# Patient Record
Sex: MALE | Race: WHITE | NOT HISPANIC OR LATINO | Employment: OTHER | URBAN - METROPOLITAN AREA
[De-identification: names, ages, dates, MRNs, and addresses within clinical notes are randomized per-mention and may not be internally consistent; named-entity substitution may affect disease eponyms.]

---

## 2023-02-09 ENCOUNTER — ASSISTED LIVING VISIT (OUTPATIENT)
Dept: GERIATRICS | Facility: CLINIC | Age: 78
End: 2023-02-09
Payer: COMMERCIAL

## 2023-02-09 VITALS
TEMPERATURE: 98 F | WEIGHT: 163 LBS | SYSTOLIC BLOOD PRESSURE: 129 MMHG | RESPIRATION RATE: 20 BRPM | DIASTOLIC BLOOD PRESSURE: 69 MMHG | HEART RATE: 90 BPM

## 2023-02-09 DIAGNOSIS — G30.9 ALZHEIMER'S DISEASE (H): Primary | ICD-10-CM

## 2023-02-09 DIAGNOSIS — F02.80 ALZHEIMER'S DISEASE (H): Primary | ICD-10-CM

## 2023-02-09 DIAGNOSIS — Z71.89 ADVANCED DIRECTIVES, COUNSELING/DISCUSSION: ICD-10-CM

## 2023-02-09 PROCEDURE — 99342 HOME/RES VST NEW LOW MDM 30: CPT | Performed by: NURSE PRACTITIONER

## 2023-02-09 NOTE — PROGRESS NOTES
"[facility] GERIATRICS    PRIMARY CARE PROVIDER AND CLINIC:  KADIE Doty CNP, [address] [facility] [address]  Chief Complaint   Patient presents with     [facility] Medical Record Number:  [id number]  Place of Service where encounter took place:  [facility]) [[id number]]    Michael Duane Therriault  is a 77 year old  (1945), admitted to Memory Care at the above facility 2/2/2023 for a higher level of care due to progressive dementia.  He was living at home with his SO/guardian Karley Lubin.     HPI:    He has a medical history significant for Alzheimer's disease, history of subarachnoid and subdural hematoma in 2020. No other medical issues and takes no meds. He had Neuropsychiatric testing 1/2020 and was only able to complete a few tasks due to severe deficits.     He is unable to provide history. Resting in bed. Repeatedly states, \"give me that stuff to eat.\" Does not respond to questions. Staff reports he's been pleasant and cooperative. He had a fall 2/6/2023 without injury. Ambulates without a device. Requires assist of 1 with cares. XR abdomen was obtained yesterday due to an emesis, abdominal tenderness and hypoactive bowel sounds and showed minimal fecal stasis. Staff reports he had a large BM and symptoms resolved.     CODE STATUS/ADVANCE DIRECTIVES DISCUSSION:  No Order  DNR/DNI  ALLERGIES: No Known Allergies   PAST MEDICAL HISTORY:   Past Medical History:   Diagnosis Date     Alzheimer's disease (H)      Subdural hematoma       PAST SURGICAL HISTORY:   has no past surgical history on file.  FAMILY HISTORY: family history is not on file.  SOCIAL HISTORY:   reports that he has never smoked. He has never used smokeless tobacco. He reports that he does not currently use alcohol. He reports that he does not use drugs.  Patient's living condition: lives in an assisted living facility. SO Karley Lubin is guardian. No children. He " worked as a caretaker.        No current outpatient medications on file.     No current facility-administered medications for this visit.       ROS:  Unable to obtain due to dementia. Positives as noted under HPI.       Vitals:  /69   Pulse 90   Temp 98  F (36.7  C)   Resp 20   Wt 73.9 kg (163 lb)   Exam:  GENERAL APPEARANCE:  Alert, in no distress, thin  ENT:  [place], oropharynx clear  EYES:  conjunctiva and lids normal  NECK:  no adenopathy, no thyromegaly  RESP:  lungs clear to auscultation   CV:  regular rate and rhythm, no murmur, no edema  ABDOMEN:  normal bowel sounds, soft, nontender, no distension, no masses   M/S:   gait unsteady. ROBERT with good strength. No joint inflammation  SKIN:  no rashes or open areas  PSYCH:  oriented to self, insight and judgement impaired, memory impaired , affect and mood normal    Lab/Diagnostic data:  Recent labs in [facility] reviewed by me today.     ASSESSMENT / PLAN:  (G30.9,  F02.80) Alzheimer's disease (H)  (primary encounter diagnosis)  Comment: severe deficits with loss of language and low functionals status. He takes no chronic meds.   Plan: Memory Care staff assistance with cares, meals, activities, med admin  Discussed with staff.     (Z71.89) Advanced directives, counseling/discussion  Comment: POLST on file indicates DNR/DNI, which is consistent with documentation from [facility] 11/8/2022  Plan: epic orders updated.        Electronically signed by:  KADIE Doty CNP

## 2023-02-09 NOTE — LETTER
2/9/2023        RE: Michael Duane Therriault  [address] [address]        [facility] GERIATRICS    PRIMARY CARE PROVIDER AND CLINIC:  KADIE Doty CNP, [address] [facility] [address]***  Chief Complaint   Patient presents with     [facility] Medical Record Number:  [id number]  Place of Service where encounter took place:  [facility] [[id number]]    Michael Duane Therriault  is a 77 year old  (1945), admitted to the above facility from home due to care needs ***. .   HPI:    ***    CODE STATUS/ADVANCE DIRECTIVES DISCUSSION:  No Order  DNR only  ALLERGIES: No Known Allergies   PAST MEDICAL HISTORY: No past medical history on file.   PAST SURGICAL HISTORY:   has no past surgical history on file.  FAMILY HISTORY: family history is not on file.  SOCIAL HISTORY:     Patient's living condition: lives in an assisted living facility    Post Discharge Medication Reconciliation Status:   MED REC REQUIRED{TIP  Click the link below to document or use med rec list, use list to pull in response :229992}  Post Medication Reconciliation Status: {MED REC LIST:882971}       No current outpatient medications on file.     No current facility-administered medications for this visit.       ROS:  {ROS FGS:027344}    Vitals:  /69   Pulse 90   Temp 98  F (36.7  C)   Resp 20   Wt 73.9 kg (163 lb)   Exam:  {Nursing home physical exam :606088}    Lab/Diagnostic data:  {fgslab:569649}    ASSESSMENT/PLAN:    {FGS DX2:558211}    Orders:  {fgsorders:985237}  ***    Total time spent with patient visit at the skilled nursing facility was {1/2/3/4/5:721270} including {1/2/3/4/5:021300}. Greater than 50% of total time spent with counseling and coordinating care due to ***.     Electronically signed by:  Wilber Ernst ***                      Sincerely,        KADIE Doty CNP

## 2023-03-03 ENCOUNTER — PATIENT OUTREACH (OUTPATIENT)
Dept: GERIATRIC MEDICINE | Facility: CLINIC | Age: 78
End: 2023-03-03
Payer: COMMERCIAL

## 2023-03-03 NOTE — LETTER
March 3, 2023      MICHAEL DUANE THERRIAULT  THE [place] [address] [facility] [address]      Dear Bharat:    Valente, my name is Sarahy Miramontes MA, LSW. You are eligible for [facility] Case Management program through your enrollment in [employer]. We think you may benefit from this program. I am writing to invite you to be in our Case Management program.     The following are a few things the Case Management program can help you with:    Select or change your primary care doctor or primary care clinic    Find a specialist, if needed, near your home    Receive preventive care, such as flu shots    Join programs offered by [facility] that interest you, like wellness programs    As your , I will do the following to enroll you in the Case Management Program:    Schedule a telephone call with you to answer any questions you may have about case management    Conduct an assessment by phone to identify needs case management can help you with    Develop a care plan to address those needs    Help you obtain available care and resources as needed    I will call you soon to discuss your interest in this program and your health care needs.    Being in the Case Management program is voluntary and offered to you at no cost. If you accept being in the Case Management program, you can stop any time by calling me at [phone].    Sincerely,      Sarahy Miramontes MA, LSW    E-mail: [email or web address]  Phone: [phone]      [facility]    [id number]                                     (01/2020)          [facility][note 3] [place] [address]  [phone]  fax [phone]  [facility]

## 2023-03-03 NOTE — PROGRESS NOTES
[facility] Care Coordination Contact    Member became effective with  [employer] on 3/1/2023 with [employer].     Welcome letter sent to Sánchez GAFFNEY.     Sanjana Marte  Care Management Specialist   [facility]   [phone]

## 2023-03-05 PROBLEM — G30.9 ALZHEIMER'S DISEASE (H): Status: ACTIVE | Noted: 2023-03-05

## 2023-03-05 PROBLEM — F02.80 ALZHEIMER'S DISEASE (H): Status: ACTIVE | Noted: 2023-03-05

## 2023-03-07 ENCOUNTER — ASSISTED LIVING VISIT (OUTPATIENT)
Dept: GERIATRICS | Facility: CLINIC | Age: 78
End: 2023-03-07
Payer: COMMERCIAL

## 2023-03-07 VITALS
RESPIRATION RATE: 20 BRPM | DIASTOLIC BLOOD PRESSURE: 77 MMHG | SYSTOLIC BLOOD PRESSURE: 118 MMHG | TEMPERATURE: 97.7 F | WEIGHT: 134 LBS | HEART RATE: 61 BPM

## 2023-03-07 DIAGNOSIS — Z71.89 ADVANCED DIRECTIVES, COUNSELING/DISCUSSION: ICD-10-CM

## 2023-03-07 DIAGNOSIS — R26.81 GAIT INSTABILITY: ICD-10-CM

## 2023-03-07 DIAGNOSIS — F02.80 ALZHEIMER'S DISEASE (H): Primary | ICD-10-CM

## 2023-03-07 DIAGNOSIS — G30.9 ALZHEIMER'S DISEASE (H): Primary | ICD-10-CM

## 2023-03-07 PROCEDURE — 99349 HOME/RES VST EST MOD MDM 40: CPT | Performed by: NURSE PRACTITIONER

## 2023-03-07 NOTE — PROGRESS NOTES
"[facility] GERIATRICS    Chief Complaint   Patient presents with     RECHECK       The health plan new enrollment has happened. I have reviewed the  MDS, the preventative needs,  and facility care plan. The level of care is appropriate. I have reviewed the code status/advanced directives.       HPI:  Michael Duane Therriault is a 77 year old  (1945), who is being seen today for an episodic care visit at: [facility]) [[id number]].  He moved to Memory Care at this facility 2/2/2023 for a higher level of care due to advanced dementia.   Medical history significant for Alzheimer's disease, history of subarachnoid and subdural hematoma in 2020. No other medical issues and takes no meds. He had Neuropsychiatric testing 1/2020 and was only able to complete a few tasks due to severe deficits.     Today's concern is:      Alzheimer's disease (H)  Gait instability  Advanced directives, counseling/discussion  He is unable to provide history. More communicative today, then gets up and walks away.  He's in his apartment and says he's going to \"work on this number puzzle.\" He repeatedly asks for something to eat. Doesn't respond to questions appropriately. Staff reports no behavior issues. He attempted to eat flowers that were on the table. Ambulates without a device. Requires assist of 1 with cares.   Spoke with his friend/guardian Karley Lubin. They have been friends for many years and she was his caregiver at home. She reports he would eat anything, including dirt, cat food, plants, paper napkins. She's not sure he recognizes her anymore.     Allergies, and PMH/PSH reviewed in EPIC today    REVIEW OF SYSTEMS:  Unable to obtain due to dementia. Positives as noted under HPI.       Objective:   /77   Pulse 61   Temp 97.7  F (36.5  C)   Resp 20   Wt 60.8 kg (134 lb)   GENERAL APPEARANCE:  Alert, in no distress, thin  ENT:  [place], oropharynx clear  EYES:  conjunctiva and lids normal  NECK:  " no adenopathy, no thyromegaly  RESP:  lungs clear to auscultation   CV:  regular rate and rhythm, no murmur, no edema  ABDOMEN: soft, nontender, no distension, no masses   M/S:   gait unsteady. Slightly stooped posture. ROBERT with good strength. No joint inflammation  SKIN:  no rashes or open areas  PSYCH:  oriented to self, insight and judgement impaired, memory impaired , affect and mood normal    Recent labs in [facility] reviewed by me today.     ASSESSMENT / PLAN:  (G30.9,  F02.80) Alzheimer's disease (H)  (primary encounter diagnosis)  (R26.81) Gait instability  Comment: advanced disease with loss of verbal skills, severe cognitive deficits. No behavior issues   Plan: Memory Care staff assistance with cares, meals, activities, med admin      (Z71.89) Advanced directives, counseling/discussion  Comment: he has a healthcare directive and POLST. Guardian confirms goals of care are comfort focused, DNR/DNI, avoid hospitalization, hospice when appropriate  Plan: orders updated             Electronically signed by: KADIE Doty CNP

## 2023-03-07 NOTE — LETTER
"    3/7/2023        RE: Michael Duane Therriault  [facility]  [address] [facility] [address]        [facility] GERIATRICS    Chief Complaint   Patient presents with     RECHECK       The health plan new enrollment has happened. I have reviewed the  MDS, the preventative needs,  and facility care plan. The level of care is appropriate. I have reviewed the code status/advanced directives.       HPI:  Michael Duane Therriault is a 77 year old  (1945), who is being seen today for an episodic care visit at: [facility]) [[id number]].  He moved to Memory Care at this facility 2/2/2023 for a higher level of care due to advanced dementia.   Medical history significant for Alzheimer's disease, history of subarachnoid and subdural hematoma in 2020. No other medical issues and takes no meds. He had Neuropsychiatric testing 1/2020 and was only able to complete a few tasks due to severe deficits.     Today's concern is:      Alzheimer's disease (H)  Gait instability  Advanced directives, counseling/discussion  He is unable to provide history. More communicative today, then gets up and walks away.  He's in his apartment and says he's going to \"work on this number puzzle.\" He repeatedly asks for something to eat. Doesn't respond to questions appropriately. Staff reports no behavior issues. He attempted to eat flowers that were on the table. Ambulates without a device. Requires assist of 1 with cares.   Spoke with his friend/guardian Karley Lubin. They have been friends for many years and she was his caregiver at home. She reports he would eat anything, including dirt, cat food, plants, paper napkins. She's not sure he recognizes her anymore.     Allergies, and PMH/PSH reviewed in EPIC today    REVIEW OF SYSTEMS:  Unable to obtain due to dementia. Positives as noted under HPI.       Objective:   /77   Pulse 61   Temp 97.7  F (36.5  C)   Resp 20   Wt 60.8 kg (134 " lb)   GENERAL APPEARANCE:  Alert, in no distress, thin  ENT:  [place], oropharynx clear  EYES:  conjunctiva and lids normal  NECK:  no adenopathy, no thyromegaly  RESP:  lungs clear to auscultation   CV:  regular rate and rhythm, no murmur, no edema  ABDOMEN: soft, nontender, no distension, no masses   M/S:   gait unsteady. Slightly stooped posture. ROBERT with good strength. No joint inflammation  SKIN:  no rashes or open areas  PSYCH:  oriented to self, insight and judgement impaired, memory impaired , affect and mood normal    Recent labs in [facility] reviewed by me today.     ASSESSMENT / PLAN:  (G30.9,  F02.80) Alzheimer's disease (H)  (primary encounter diagnosis)  (R26.81) Gait instability  Comment: advanced disease with loss of verbal skills, severe cognitive deficits. No behavior issues   Plan: Memory Care staff assistance with cares, meals, activities, med admin      (Z71.89) Advanced directives, counseling/discussion  Comment: he has a healthcare directive and POLST. Guardian confirms goals of care are comfort focused, DNR/DNI, avoid hospitalization, hospice when appropriate  Plan: orders updated             Electronically signed by: KADIE Doty CNP             Sincerely,        KADIE Doty CNP

## 2023-04-06 ENCOUNTER — ASSISTED LIVING VISIT (OUTPATIENT)
Dept: GERIATRICS | Facility: CLINIC | Age: 78
End: 2023-04-06
Payer: COMMERCIAL

## 2023-04-06 VITALS
RESPIRATION RATE: 15 BRPM | TEMPERATURE: 99.1 F | DIASTOLIC BLOOD PRESSURE: 76 MMHG | HEART RATE: 86 BPM | SYSTOLIC BLOOD PRESSURE: 133 MMHG | WEIGHT: 134 LBS

## 2023-04-06 DIAGNOSIS — R26.81 GAIT INSTABILITY: ICD-10-CM

## 2023-04-06 DIAGNOSIS — G30.9 ALZHEIMER'S DISEASE (H): Primary | ICD-10-CM

## 2023-04-06 DIAGNOSIS — F02.80 ALZHEIMER'S DISEASE (H): Primary | ICD-10-CM

## 2023-04-06 PROCEDURE — 99348 HOME/RES VST EST LOW MDM 30: CPT | Performed by: NURSE PRACTITIONER

## 2023-04-06 NOTE — PROGRESS NOTES
"[facility] GERIATRICS    Chief Complaint   Patient presents with     RECHECK     HPI:  Michael Duane Therriault is a 77 year old  (1945), who is being seen today for an episodic care visit at: [facility] [[id number]].   He moved to Memory Care at this facility 2/2/2023 for a higher level of care due to advanced dementia.   Medical history significant for Alzheimer's disease, history of subarachnoid and subdural hematoma in 2020. No other medical issues and takes no meds. He had Neuropsychiatric testing 1/2020 and was only able to complete a few tasks due to severe deficits.        Today's concern is:      Alzheimer's disease (H)  Gait instability   He is resting in bed, awake and makes eye contact.  Does not respond to questions. Ambulates without a device. No falls. Staff reports he continues to try and eat various items, including sugar packets and paper. No behavior issues. Requires assist of 1 with cares.   Spoke with his friend/POWINSTON Lubin, who reports he had mild cold symptoms, which resolved. She noticed an episode when his left hand was purple in color. This quickly resolved with a change in position. She feels he has \"faded a little bit more\" but has adjusted to the unit.       Allergies, and PMH/PSH reviewed in EPIC today    REVIEW OF SYSTEMS:  Unable to obtain due to dementia. Positives as noted under HPI.       Objective:   /76   Pulse 86   Temp 99.1  F (37.3  C)   Resp 15   Wt 60.8 kg (134 lb)   GENERAL APPEARANCE:  Alert, in no distress, thin  ENT:  [place], oropharynx clear  EYES:  conjunctiva and lids normal  NECK:  no adenopathy, no thyromegaly  RESP:  lungs clear to auscultation   CV:  regular rate and rhythm, no murmur, no edema  ABDOMEN: soft, nontender, no distension, no masses   M/S:  resting in bed.  ROBERT with good strength. No joint inflammation  SKIN:  no rashes or open areas  PSYCH:  oriented to self, insight and judgement impaired, memory impaired " , affect and mood normal    Recent labs in EPIC reviewed by me today.       ASSESSMENT / PLAN:  (G30.9,  F02.80) Alzheimer's disease (H)  (primary encounter diagnosis)  Comment: severe deficits with loss of verbal skills and low functional status. He has adjusted well to the unit  Plan: Memory Care staff assistance with cares, meals, activities, med admin  Goals of care are comfort focuses, minimal medical interventions, avoid hospitalization     (R26.81) Gait instability  Comment: no falls   Plan: staff supervision with mobility and assistance with cares           Electronically signed by: KADIE Doty CNP

## 2023-04-06 NOTE — LETTER
4/6/2023        RE: Michael Duane Therriault  C/o Karley Lubin  [address]  [facility] [address]        [facility] GERIATRICS    Chief Complaint   Patient presents with    RECHECK     HPI:  Michael Duane Therriault is a 77 year old  (1945), who is being seen today for an episodic care visit at: [facility]) [[id number]]. Today's concern is: ***    Allergies, and PMH/PSH reviewed in [facility] today.  REVIEW OF SYSTEMS:  {cgmtst62:262914}    Objective:   /76   Pulse 86   Temp 99.1  F (37.3  C)   Resp 15   Wt 60.8 kg (134 lb)   {Nursing home physical exam :020439}    {fgslab:099052}    Assessment/Plan:  {FGS DX2:199579}    MED REC REQUIRED{TIP  Click the link below to document or use med rec list, use list to pull in response :316465}  Post Medication Reconciliation Status: {MED REC LIST:890892}      Orders:  {fgsorders:967183}  ***    Electronically signed by: Wilber Ernst ***          Sincerely,        KADIE Doty CNP

## 2023-04-07 NOTE — PROGRESS NOTES
Rad[facility] Del[facility] [employer] Chart review      chart   No triggering events at this time   CM will follow-up as needed     Sarahy Miramontes MA [facility] Care Coordinator   [phone] - work cell phone   [phone] - work fax    ,

## 2023-04-09 ENCOUNTER — HEALTH MAINTENANCE LETTER (OUTPATIENT)
Age: 78
End: 2023-04-09

## 2023-04-13 ENCOUNTER — DOCUMENTATION ONLY (OUTPATIENT)
Dept: OTHER | Facility: CLINIC | Age: 78
End: 2023-04-13
Payer: COMMERCIAL

## 2023-04-21 ENCOUNTER — DOCUMENTATION ONLY (OUTPATIENT)
Dept: OTHER | Facility: CLINIC | Age: 78
End: 2023-04-21
Payer: COMMERCIAL

## 2023-05-23 ENCOUNTER — ASSISTED LIVING VISIT (OUTPATIENT)
Dept: GERIATRICS | Facility: CLINIC | Age: 78
End: 2023-05-23
Payer: COMMERCIAL

## 2023-05-23 VITALS
DIASTOLIC BLOOD PRESSURE: 77 MMHG | RESPIRATION RATE: 20 BRPM | WEIGHT: 128 LBS | SYSTOLIC BLOOD PRESSURE: 101 MMHG | HEART RATE: 92 BPM | TEMPERATURE: 98.8 F

## 2023-05-23 DIAGNOSIS — R26.81 GAIT INSTABILITY: ICD-10-CM

## 2023-05-23 DIAGNOSIS — F02.80 ALZHEIMER'S DISEASE (H): Primary | ICD-10-CM

## 2023-05-23 DIAGNOSIS — R63.4 WEIGHT LOSS: ICD-10-CM

## 2023-05-23 DIAGNOSIS — G30.9 ALZHEIMER'S DISEASE (H): Primary | ICD-10-CM

## 2023-05-23 PROCEDURE — 99349 HOME/RES VST EST MOD MDM 40: CPT | Performed by: NURSE PRACTITIONER

## 2023-05-23 NOTE — PROGRESS NOTES
[facility] GERIATRICS    Chief Complaint   Patient presents with     RECHECK     HPI:  Michael Duane Therriault is a 77 year old  (1945), who is being seen today for an episodic care visit at: [facility]) [[id number]].   He moved to Memory Care at this facility 2/2/2023 for a higher level of care due to advanced dementia.   Medical history significant for Alzheimer's disease, history of subarachnoid and subdural hematoma in 2020. No other medical issues and takes no meds. He had Neuropsychiatric testing 1/2020 and was only able to complete a few tasks due to severe deficits.     Today's concern is:      Alzheimer's disease (H)  Weight loss  Gait instability  He is minimally verbal and unable to provide history. His friend/POA of many years Karley Lubin is present and feels he no longer recognizes her. Remains ambulatory without a device. No falls. Requires assist of 1 with cares. Able to feed himself and will attempt to eat napkins, flowers, packets of sugar.     Allergies, and PMH/PSH reviewed in EPIC today    REVIEW OF SYSTEMS:  Unable to obtain due to dementia. Positives as noted under HPI.       Objective:   /77   Pulse 92   Temp 98.8  F (37.1  C)   Resp 20   Wt 58.1 kg (128 lb)   GENERAL APPEARANCE:  Alert, in no distress, thin  ENT:  [place] oropharynx clear  EYES:  conjunctiva and lids normal  NECK:  no adenopathy, no thyromegaly  RESP:  lungs clear to auscultation   CV:  regular rate and rhythm, no murmur, no edema  ABDOMEN: soft, nontender, no distension, no masses   M/S:  up in chair. ROBERT with good strength. No joint inflammation  SKIN:  no rashes or open areas  PSYCH:  oriented to self, insight and judgement impaired, memory impaired , affect and mood normal    Recent labs in [facility] reviewed by me today.       ASSESSMENT / PLAN:  (G30.9,  F02.80) Alzheimer's disease (H)  (primary encounter diagnosis)  Comment: advanced disease with loss of verbal skills, low functional  status   Discussed progressive nature of dementia with friend/POA Karley Lubin, who has a good understanding of his situation. Goals of care are comfort focused, minimal interventions, hospice if his status declines   Plan: Memory Care staff assistance with cares, meals, activities, med admin    (R63.4) Weight loss  Comment: weight is down 6 lbs in the past month. Good appetite and no signs of GI issues   Plan: encourage intake, follow weight     (R26.81) Gait instability  Comment: unsteady but no recent falls   Plan: staff assistance with cares, supervision with mobility           Electronically signed by: KADIE Doty CNP

## 2023-05-23 NOTE — LETTER
5/23/2023        RE: Michael Duane Therriault  C/o Karley Lubin  [address]  [facility] [address]        [facility] GERIATRICS    Chief Complaint   Patient presents with     RECHECK     HPI:  Michael Duane Therriault is a 77 year old  (1945), who is being seen today for an episodic care visit at: THE [facility]) [[id number]].   He moved to Memory Care at this facility 2/2/2023 for a higher level of care due to advanced dementia.   Medical history significant for Alzheimer's disease, history of subarachnoid and subdural hematoma in 2020. No other medical issues and takes no meds. He had Neuropsychiatric testing 1/2020 and was only able to complete a few tasks due to severe deficits.     Today's concern is:      Alzheimer's disease (H)  Weight loss  Gait instability  He is minimally verbal and unable to provide history. His friend/POA of many years Karley Lubin is present and feels he no longer recognizes her. Remains ambulatory without a device. No falls. Requires assist of 1 with cares. Able to feed himself and will attempt to eat napkins, flowers, packets of sugar.     Allergies, and PMH/PSH reviewed in EPIC today    REVIEW OF SYSTEMS:  Unable to obtain due to dementia. Positives as noted under HPI.       Objective:   /77   Pulse 92   Temp 98.8  F (37.1  C)   Resp 20   Wt 58.1 kg (128 lb)   GENERAL APPEARANCE:  Alert, in no distress, thin  ENT:  [place], oropharynx clear  EYES:  conjunctiva and lids normal  NECK:  no adenopathy, no thyromegaly  RESP:  lungs clear to auscultation   CV:  regular rate and rhythm, no murmur, no edema  ABDOMEN: soft, nontender, no distension, no masses   M/S:  up in chair. ROBERT with good strength. No joint inflammation  SKIN:  no rashes or open areas  PSYCH:  oriented to self, insight and judgement impaired, memory impaired , affect and mood normal    Recent labs in [facility] reviewed by me today.       ASSESSMENT / PLAN:  (G30.9,  F02.80)  Alzheimer's disease (H)  (primary encounter diagnosis)  Comment: advanced disease with loss of verbal skills, low functional status   Discussed progressive nature of dementia with friend/POA Karley Lubin, who has a good understanding of his situation. Goals of care are comfort focused, minimal interventions, hospice if his status declines   Plan: Memory Care staff assistance with cares, meals, activities, med admin    (R63.4) Weight loss  Comment: weight is down 6 lbs in the past month. Good appetite and no signs of GI issues   Plan: encourage intake, follow weight     (R26.81) Gait instability  Comment: unsteady but no recent falls   Plan: staff assistance with cares, supervision with mobility           Electronically signed by: KADIE Doty CNP             Sincerely,        KADIE Doty CNP

## 2023-07-06 ENCOUNTER — ASSISTED LIVING VISIT (OUTPATIENT)
Dept: GERIATRICS | Facility: CLINIC | Age: 78
End: 2023-07-06
Payer: COMMERCIAL

## 2023-07-06 VITALS
HEART RATE: 65 BPM | TEMPERATURE: 98.3 F | WEIGHT: 135 LBS | DIASTOLIC BLOOD PRESSURE: 70 MMHG | RESPIRATION RATE: 14 BRPM | SYSTOLIC BLOOD PRESSURE: 121 MMHG

## 2023-07-06 DIAGNOSIS — R26.81 GAIT INSTABILITY: ICD-10-CM

## 2023-07-06 DIAGNOSIS — G30.9 ALZHEIMER'S DISEASE (H): Primary | ICD-10-CM

## 2023-07-06 DIAGNOSIS — F02.80 ALZHEIMER'S DISEASE (H): Primary | ICD-10-CM

## 2023-07-06 DIAGNOSIS — R63.4 WEIGHT LOSS: ICD-10-CM

## 2023-07-06 PROCEDURE — 99348 HOME/RES VST EST LOW MDM 30: CPT | Performed by: NURSE PRACTITIONER

## 2023-07-06 NOTE — LETTER
7/6/2023        RE: Michael Duane Therriault  C/o Karley Lubin  [address]  [facility] [address]        [facility] GERIATRICS    Chief Complaint   Patient presents with    RECHECK     HPI:  Michael Duane Therriault is a 78 year old  (1945), who is being seen today for an episodic care visit at: [facility] ([facility]) [[id number]]. Today's concern is: ***    Allergies, and PMH/PSH reviewed in [facility] today.  REVIEW OF SYSTEMS:  {pcnnmo79:197520}    Objective:   /70   Pulse 65   Temp 98.3  F (36.8  C)   Resp 14   Wt 61.2 kg (135 lb)   {Nursing home physical exam :461279}    {fgslab:929979}    Assessment/Plan:  {FGS DX2:634919}    MED REC REQUIRED{TIP  Click the link below to document or use med rec list, use list to pull in response :952417}  Post Medication Reconciliation Status: {MED REC LIST:011305}      Orders:  {fgsorders:291140}  ***    Electronically signed by: Wilber Ernst ***          Sincerely,        KADIE Doty CNP

## 2023-07-06 NOTE — PROGRESS NOTES
[facility] GERIATRICS    Chief Complaint   Patient presents with     RECHECK     HPI:  Michael Duane Therriault is a 78 year old  (1945), who is being seen today for an episodic care visit at: [facility]) [[id number]].   He moved to Memory Care at this facility 2/2/2023 for a higher level of care due to advanced dementia.   Medical history significant for Alzheimer's disease, history of subarachnoid and subdural hematoma in 2020. No other medical issues and takes no meds. He had Neuropsychiatric testing 1/2020 and was only able to complete a few tasks due to severe deficits.     Today's concern is:      Alzheimer's disease (H)  Weight loss  Gait instability   He is unable to provide history. His friend/guardian Karley Lubin is present and feels he's doing well. He's smiling and in a good mood today, repeatedly waved at us during the visit. He seems to recognize Karley as a familiar face. Ambulatory without a device. No falls. Requires assist of 1 with cares. Staff reports no new issues.      Allergies, and PMH/PSH reviewed in EPIC today    REVIEW OF SYSTEMS:  Unable to obtain due to dementia. Positives as noted under HPI.       Objective:   /70   Pulse 65   Temp 98.3  F (36.8  C)   Resp 14   Wt 61.2 kg (135 lb)    Exam unchanged   GENERAL APPEARANCE:  Alert, in no distress, thin  ENT:  [place], oropharynx clear  EYES:  conjunctiva and lids normal  NECK:  no adenopathy, no thyromegaly  RESP:  lungs clear to auscultation   CV:  regular rate and rhythm, no murmur, no edema  ABDOMEN: soft, nontender, no distension, no masses   M/S:  up in chair. ROBERT with good strength. No joint inflammation  SKIN:  no rashes or open areas  PSYCH:  oriented to self, insight and judgement impaired, memory impaired , affect and mood normal    Recent labs in [employer] reviewed by me today.     ASSESSMENT / PLAN:  (G30.9,  F02.80) Alzheimer's disease (H)  (primary encounter diagnosis)  Comment: severe deficits  with loss of verbal skills. Appears in good spirits, no behavior issues   Plan: Memory Care staff assistance with cares, meals, activities, med admin  Discussed with staff     (R63.4) Weight loss  Comment: weight is back up to 135 lbs, baseline   Plan: encourage intake. Follow weight    (R26.81) Gait instability  Comment: no recent falls   Plan: supervision and staff assistance with cares and mobility         Electronically signed by: KADIE Doty CNP

## 2023-08-01 ENCOUNTER — ASSISTED LIVING VISIT (OUTPATIENT)
Dept: GERIATRICS | Facility: CLINIC | Age: 78
End: 2023-08-01
Payer: COMMERCIAL

## 2023-08-01 VITALS
RESPIRATION RATE: 17 BRPM | DIASTOLIC BLOOD PRESSURE: 56 MMHG | SYSTOLIC BLOOD PRESSURE: 119 MMHG | TEMPERATURE: 99.3 F | HEART RATE: 77 BPM | WEIGHT: 135 LBS

## 2023-08-01 DIAGNOSIS — H10.33 ACUTE CONJUNCTIVITIS OF BOTH EYES, UNSPECIFIED ACUTE CONJUNCTIVITIS TYPE: Primary | ICD-10-CM

## 2023-08-01 DIAGNOSIS — F02.80 ALZHEIMER'S DISEASE (H): ICD-10-CM

## 2023-08-01 DIAGNOSIS — G30.9 ALZHEIMER'S DISEASE (H): ICD-10-CM

## 2023-08-01 PROCEDURE — 99348 HOME/RES VST EST LOW MDM 30: CPT | Performed by: NURSE PRACTITIONER

## 2023-08-01 RX ORDER — TOBRAMYCIN AND DEXAMETHASONE 3; 1 MG/ML; MG/ML
1 SUSPENSION/ DROPS OPHTHALMIC 3 TIMES DAILY
Qty: 1.1 ML | Refills: 0 | Status: SHIPPED | OUTPATIENT
Start: 2023-08-01 | End: 2023-08-08

## 2023-08-01 NOTE — LETTER
8/1/2023        RE: Michael Duane Therriault  C/o Karley Casonrenettaradha  [address]  [facility] [address]        [facility] GERIATRICS    Chief Complaint   Patient presents with     RECHECK     HPI:  Michael Duane Therriault is a 78 year old  (1945), who is being seen today for an episodic care visit at: [facility]) [[id number]].   He moved to Memory Care at this facility 2/2/2023 for a higher level of care due to advanced dementia.   Medical history significant for Alzheimer's disease, history of subarachnoid and subdural hematoma in 2020. No other medical issues and takes no meds. He had Neuropsychiatric testing 1/2020 and was only able to complete a few tasks due to severe deficits.       Today's concern is:     Acute conjunctivitis of both eyes, unspecified acute conjunctivitis type  Alzheimer's disease (H)  He is seen today after the nurse reported erythema and irritation of both eyes. He is unable to provide history. Smiles, nonverbal. Ambulates without a device. Requires assist of 1 with cares.     Allergies, and PMH/PSH reviewed in EPIC today    REVIEW OF SYSTEMS:  Unable to obtain due to dementia. Positives as noted under HPI.       Objective:   /56   Pulse 77   Temp 99.3  F (37.4  C)   Resp 17   Wt 61.2 kg (135 lb)   GENERAL APPEARANCE:  Alert, in no distress, thin  ENT:  [place], oropharynx clear  EYES:  erythema of both eyes, mild edema of upper lids, scant yellow drainage left eye   RESP:  no respiratory distress   CV:  no edema  ABDOMEN: no distension   M/S:  up in chair. ROBERT with good strength. No joint inflammation  SKIN:  no rashes or open areas  PSYCH:  oriented to self, insight and judgement impaired, memory impaired , affect and mood normal        ASSESSMENT / PLAN:  (H10.33) Acute conjunctivitis of both eyes, unspecified acute conjunctivitis type  (primary encounter diagnosis)  Comment: erythema and drainage noted this morning. Unable to assess his vision  due to severe dementia, but he otherwise appears at baseline   Plan: tobradex to both eyes X 7 days.   Discussed with his friend/guardian Karley Lubin  Discussed with staff     (G30.9,  F02.80) Alzheimer's disease (H)  Comment: severe deficits, loss of verbal skills. No behavior issues   Plan: Memory Care staff assistance with cares, meals, activities, med admin        Electronically signed by: KADIE Doty CNP         Sincerely,        KADIE Doty CNP

## 2023-08-01 NOTE — PROGRESS NOTES
[facility] GERIATRICS    Chief Complaint   Patient presents with    RECHECK     HPI:  Michael Duane Therriault is a 78 year old  (1945), who is being seen today for an episodic care visit at: [facility]) [[id number]].   He moved to Memory Care at this facility 2/2/2023 for a higher level of care due to advanced dementia.   Medical history significant for Alzheimer's disease, history of subarachnoid and subdural hematoma in 2020. No other medical issues and takes no meds. He had Neuropsychiatric testing 1/2020 and was only able to complete a few tasks due to severe deficits.       Today's concern is:     Acute conjunctivitis of both eyes, unspecified acute conjunctivitis type  Alzheimer's disease (H)  He is seen today after the nurse reported erythema and irritation of both eyes. He is unable to provide history. Smiles, nonverbal. Ambulates without a device. Requires assist of 1 with cares.     Allergies, and PMH/PSH reviewed in EPIC today    REVIEW OF SYSTEMS:  Unable to obtain due to dementia. Positives as noted under HPI.       Objective:   /56   Pulse 77   Temp 99.3  F (37.4  C)   Resp 17   Wt 61.2 kg (135 lb)   GENERAL APPEARANCE:  Alert, in no distress, thin  ENT:  [place], oropharynx clear  EYES:  erythema of both eyes, mild edema of upper lids, scant yellow drainage left eye   RESP:  no respiratory distress   CV:  no edema  ABDOMEN: no distension   M/S:  up in chair. ROBERT with good strength. No joint inflammation  SKIN:  no rashes or open areas  PSYCH:  oriented to self, insight and judgement impaired, memory impaired , affect and mood normal        ASSESSMENT / PLAN:  (H10.33) Acute conjunctivitis of both eyes, unspecified acute conjunctivitis type  (primary encounter diagnosis)  Comment: erythema and drainage noted this morning. Unable to assess his vision due to severe dementia, but he otherwise appears at baseline   Plan: tobradex to both eyes X 7 days.   Discussed with  his friend/guardian Karley Lubin  Discussed with staff     (G30.9,  F02.80) Alzheimer's disease (H)  Comment: severe deficits, loss of verbal skills. No behavior issues   Plan: Memory Care staff assistance with cares, meals, activities, med admin        Electronically signed by: KADIE Doty CNP

## 2023-11-02 ENCOUNTER — ASSISTED LIVING VISIT (OUTPATIENT)
Dept: GERIATRICS | Facility: CLINIC | Age: 78
End: 2023-11-02
Payer: COMMERCIAL

## 2023-11-02 VITALS
OXYGEN SATURATION: 97 % | BODY MASS INDEX: 22.99 KG/M2 | WEIGHT: 138 LBS | SYSTOLIC BLOOD PRESSURE: 124 MMHG | DIASTOLIC BLOOD PRESSURE: 67 MMHG | HEART RATE: 58 BPM | RESPIRATION RATE: 22 BRPM | HEIGHT: 65 IN | TEMPERATURE: 97.5 F

## 2023-11-02 DIAGNOSIS — R63.4 WEIGHT LOSS: ICD-10-CM

## 2023-11-02 DIAGNOSIS — R26.81 GAIT INSTABILITY: ICD-10-CM

## 2023-11-02 DIAGNOSIS — F02.80 ALZHEIMER'S DISEASE (H): Primary | ICD-10-CM

## 2023-11-02 DIAGNOSIS — G30.9 ALZHEIMER'S DISEASE (H): Primary | ICD-10-CM

## 2023-11-02 PROCEDURE — 99348 HOME/RES VST EST LOW MDM 30: CPT | Performed by: NURSE PRACTITIONER

## 2023-11-02 NOTE — PROGRESS NOTES
"[facility] GERIATRICS    Chief Complaint   Patient presents with    RECHECK     HPI:  Michael Duane Therriault is a 78 year old  (1945), who is being seen today for an episodic care visit at: [facility]) [[id number]].   He moved to Memory Care at this facility 2/2/2023 for a higher level of care.   Medical history significant for Alzheimer's disease, history of subarachnoid and subdural hematoma in 2020. No other medical issues and takes no meds. He had Neuropsychiatric testing 1/2020 and was only able to complete a few tasks due to severe deficits.       Today's concern is:      Alzheimer's disease (H)  Weight loss  Gait instability  He is unable to provide history. Smiles and makes sounds, no words. Remains ambulatory. No falls. Requires assist of 1 with cares. His friend/guardian Karley Lubin is present. She's not sure he recognizes her anymore. She's noticed that he's slowing down and gait is unsteady at times. Staff reports no concerns, no behavior issues.     Allergies, and PMH/PSH reviewed in EPIC today    REVIEW OF SYSTEMS:  Unable to obtain due to dementia. Positives as noted under HPI.       Objective:   /67   Pulse 58   Temp 97.5  F (36.4  C)   Resp 22   Ht 1.638 m (5' 4.5\")   Wt 62.6 kg (138 lb)   SpO2 97%   BMI 23.32 kg/m    GENERAL APPEARANCE:  Alert, in no distress, thin  ENT:  [place], oropharynx clear  EYES:  conjunctiva and lids normal  NECK:  no adenopathy, no thyromegaly  RESP:  lungs clear to auscultation   CV:  regular rate and rhythm, no murmur, no edema  ABDOMEN: soft, nontender, no distension, no masses   M/S:  gait slow. ROBERT with good strength. No joint inflammation  SKIN:  no rashes or open areas  PSYCH:  oriented to self, insight and judgement impaired, memory impaired , affect and mood normal    ASSESSMENT / PLAN:  (G30.9,  F02.80) Alzheimer's disease (H)  (primary encounter diagnosis)  Comment: advanced disease with loss of verbal skills and low " functional status.   Plan: Memory Care staff assistance with cares, meals, activities, med admin  Guardian/friend Karley Lubin confirms comfort focused care, no hospitalization, hospice if his status declines.     (R63.4) Weight loss  Comment: weight stable   Plan: staff supervision during meals. Follow weight.     (R26.81) Gait instability  Comment: gradual decline in mobility. No falls   Plan: staff supervision with ambulation and cares         Electronically signed by: KADIE Doty CNP

## 2023-11-02 NOTE — LETTER
"    11/2/2023        RE: Michael Duane Therriault  C/o Karley Amee  [address]  [facility] [address]        [facility] GERIATRICS    Chief Complaint   Patient presents with     RECHECK     HPI:  Michael Duane Therriault is a 78 year old  (1945), who is being seen today for an episodic care visit at: [facility]) [[id number]].   He moved to Memory Care at this facility 2/2/2023 for a higher level of care.   Medical history significant for Alzheimer's disease, history of subarachnoid and subdural hematoma in 2020. No other medical issues and takes no meds. He had Neuropsychiatric testing 1/2020 and was only able to complete a few tasks due to severe deficits.       Today's concern is:      Alzheimer's disease (H)  Weight loss  Gait instability  He is unable to provide history. Smiles and makes sounds, no words. Remains ambulatory. No falls. Requires assist of 1 with cares. His friend/guardian Karley Lubin is present. She's not sure he recognizes her anymore. She's noticed that he's slowing down and gait is unsteady at times. Staff reports no concerns, no behavior issues.     Allergies, and PMH/PSH reviewed in EPIC today    REVIEW OF SYSTEMS:  Unable to obtain due to dementia. Positives as noted under HPI.       Objective:   /67   Pulse 58   Temp 97.5  F (36.4  C)   Resp 22   Ht 1.638 m (5' 4.5\")   Wt 62.6 kg (138 lb)   SpO2 97%   BMI 23.32 kg/m    GENERAL APPEARANCE:  Alert, in no distress, thin  ENT:  [place], oropharynx clear  EYES:  conjunctiva and lids normal  NECK:  no adenopathy, no thyromegaly  RESP:  lungs clear to auscultation   CV:  regular rate and rhythm, no murmur, no edema  ABDOMEN: soft, nontender, no distension, no masses   M/S:  gait slow. ROBERT with good strength. No joint inflammation  SKIN:  no rashes or open areas  PSYCH:  oriented to self, insight and judgement impaired, memory impaired , affect and mood normal    ASSESSMENT / PLAN:  (G30.9,  F02.80) " Alzheimer's disease (H)  (primary encounter diagnosis)  Comment: advanced disease with loss of verbal skills and low functional status.   Plan: Memory Care staff assistance with cares, meals, activities, med admin  Guardian/friend Karley Lubin confirms comfort focused care, no hospitalization, hospice if his status declines.     (R63.4) Weight loss  Comment: weight stable   Plan: staff supervision during meals. Follow weight.     (R26.81) Gait instability  Comment: gradual decline in mobility. No falls   Plan: staff supervision with ambulation and cares         Electronically signed by: KADIE Doty CNP          Sincerely,        KADIE Doty CNP

## 2023-12-19 ENCOUNTER — ASSISTED LIVING VISIT (OUTPATIENT)
Dept: GERIATRICS | Facility: CLINIC | Age: 78
End: 2023-12-19
Payer: COMMERCIAL

## 2023-12-19 VITALS
WEIGHT: 142.8 LBS | DIASTOLIC BLOOD PRESSURE: 86 MMHG | SYSTOLIC BLOOD PRESSURE: 106 MMHG | TEMPERATURE: 98.8 F | BODY MASS INDEX: 24.13 KG/M2 | HEART RATE: 70 BPM | RESPIRATION RATE: 24 BRPM

## 2023-12-19 DIAGNOSIS — R63.4 WEIGHT LOSS: ICD-10-CM

## 2023-12-19 DIAGNOSIS — G30.9 ALZHEIMER'S DISEASE (H): ICD-10-CM

## 2023-12-19 DIAGNOSIS — U07.1 INFECTION DUE TO 2019 NOVEL CORONAVIRUS: Primary | ICD-10-CM

## 2023-12-19 DIAGNOSIS — F02.80 ALZHEIMER'S DISEASE (H): ICD-10-CM

## 2023-12-19 PROCEDURE — 99349 HOME/RES VST EST MOD MDM 40: CPT | Performed by: NURSE PRACTITIONER

## 2023-12-19 RX ORDER — ACETAMINOPHEN 325 MG/1
650 TABLET ORAL EVERY 4 HOURS PRN
Qty: 20 TABLET | Refills: 3 | Status: SHIPPED | OUTPATIENT
Start: 2023-12-19

## 2023-12-19 NOTE — PROGRESS NOTES
[facility] GERIATRICS    Chief Complaint   Patient presents with    RECHECK     HPI:  Michael Duane Therriault is a 78 year old  (1945), who is being seen today for an episodic care visit at: [facility]) [[id number]].   He moved to Memory Care at this facility 2/2/2023 for a higher level of care.   Medical history significant for Alzheimer's disease, history of subarachnoid and subdural hematoma in 2020. No other medical issues and takes no meds. He had Neuropsychiatric testing 1/2020 and was only able to complete a few tasks due to severe deficits.       Today's concern is:     Infection due to 2019 novel coronavirus  Alzheimer's disease (H)  Weight loss  He is seen today after staff reported lethargy and temp 101.5. Rapid COVID-19 is positive. Staff reports he was unable to tolerate breakfast and required assist of 2 with transfer to a wheelchair. He is usually alert and ambulatory and requires assist of 1 with cares. Nonverbal at baseline. Staff reports he was his usual self yesterday. No cough noted. O2 sats >90%.        Allergies, and PMH/PSH reviewed in EPIC today    REVIEW OF SYSTEMS:  Unable to obtain due to dementia. Positives as noted under HPI.       Objective:   /86   Pulse 70   Temp 98.8  F (37.1  C)   Resp 24   Wt 64.8 kg (142 lb 12.8 oz)   BMI 24.13 kg/m    GENERAL APPEARANCE:  sleeping, flushed, diaphoretic   RESP:  lungs clear to auscultation   CV:  regular rate and rhythm, no murmur, no edema  ABDOMEN:  soft, non-tender, no distension  M/S:   in bed. No joint inflammation   SKIN:  no rashes or open areas    ASSESSMENT / PLAN:  (U07.1) Infection due to 2019 novel coronavirus  (primary encounter diagnosis)  Comment: acutely ill with fever and lethargy. He is unable to swallow pills and capsules at baseline. Consulted with Jo Ann Nicole, Javon and both paxlovid and molnupiravir capsules should not be opened.   Discussed with his guardian/friend Karley Lubin and  she agrees that he would not be able to swallow capsules. She accepts not being able to treat with antivirals and declines hospitalization based on goals of care and patient's history of not wanting meds and medical care. This is very reasonable in the setting of advanced dementia.   Plan: encourage fluids and intake as tolerated. Tylenol prn fever. Closely monitor over the next few days and if no improvement, refer to hospice.   Discussed with staff.      (G30.9,  F02.80) Alzheimer's disease (H)  Comment: advanced disease with loss of verbal skills and low functional status. No behavior issues   Plan: Memory Care staff assistance with cares, meals, activities, med admin     (R63.4) Weight loss  Comment: most recent weight of 120 lbs is down 15-20 lbs. He appears thinner, but not this much of a drop in one month   Plan: recheck weight. Staff supervision during meals              Electronically signed by: KADIE Doty CNP

## 2023-12-19 NOTE — LETTER
12/19/2023        RE: Michael Duane Therriault  C/o Karley Lubin  [address]  [facility] [address]        [facility] GERIATRICS    Chief Complaint   Patient presents with     RECHECK     HPI:  Michael Duane Therriault is a 78 year old  (1945), who is being seen today for an episodic care visit at: [facility]) [[id number]].   He moved to Memory Care at this facility 2/2/2023 for a higher level of care.   Medical history significant for Alzheimer's disease, history of subarachnoid and subdural hematoma in 2020. No other medical issues and takes no meds. He had Neuropsychiatric testing 1/2020 and was only able to complete a few tasks due to severe deficits.       Today's concern is:     Infection due to 2019 novel coronavirus  Alzheimer's disease (H)  Weight loss  He is seen today after staff reported lethargy and temp 101.5. Rapid COVID-19 is positive. Staff reports he was unable to tolerate breakfast and required assist of 2 with transfer to a wheelchair. He is usually alert and ambulatory and requires assist of 1 with cares. Nonverbal at baseline. Staff reports he was his usual self yesterday. No cough noted. O2 sats >90%.        Allergies, and PMH/PSH reviewed in EPIC today    REVIEW OF SYSTEMS:  Unable to obtain due to dementia. Positives as noted under HPI.       Objective:   /86   Pulse 70   Temp 98.8  F (37.1  C)   Resp 24   Wt 64.8 kg (142 lb 12.8 oz)   BMI 24.13 kg/m    GENERAL APPEARANCE:  sleeping, flushed, diaphoretic   RESP:  lungs clear to auscultation   CV:  regular rate and rhythm, no murmur, no edema  ABDOMEN:  soft, non-tender, no distension  M/S:   in bed. No joint inflammation   SKIN:  no rashes or open areas    ASSESSMENT / PLAN:  (U07.1) Infection due to 2019 novel coronavirus  (primary encounter diagnosis)  Comment: acutely ill with fever and lethargy. He is unable to swallow pills and capsules at baseline. Consulted with Jo Ann Nicole, PharmD and  both paxlovid and molnupiravir capsules should not be opened.   Discussed with his guardian/friend Karley Lubin and she agrees that he would not be able to swallow capsules. She accepts not being able to treat with antivirals and declines hospitalization based on goals of care and patient's history of not wanting meds and medical care. This is very reasonable in the setting of advanced dementia.   Plan: encourage fluids and intake as tolerated. Tylenol prn fever. Closely monitor over the next few days and if no improvement, refer to hospice.   Discussed with staff.      (G30.9,  F02.80) Alzheimer's disease (H)  Comment: advanced disease with loss of verbal skills and low functional status. No behavior issues   Plan: Memory Care staff assistance with cares, meals, activities, med admin     (R63.4) Weight loss  Comment: most recent weight of 120 lbs is down 15-20 lbs. He appears thinner, but not this much of a drop in one month   Plan: recheck weight. Staff supervision during meals              Electronically signed by: KADIE Doty CNP           Sincerely,        KADIE Doty CNP

## 2023-12-21 ENCOUNTER — ASSISTED LIVING VISIT (OUTPATIENT)
Dept: GERIATRICS | Facility: CLINIC | Age: 78
End: 2023-12-21
Payer: COMMERCIAL

## 2023-12-21 DIAGNOSIS — F02.80 ALZHEIMER'S DISEASE (H): ICD-10-CM

## 2023-12-21 DIAGNOSIS — G30.9 ALZHEIMER'S DISEASE (H): ICD-10-CM

## 2023-12-21 DIAGNOSIS — U07.1 INFECTION DUE TO 2019 NOVEL CORONAVIRUS: Primary | ICD-10-CM

## 2023-12-21 PROCEDURE — 99347 HOME/RES VST EST SF MDM 20: CPT | Performed by: NURSE PRACTITIONER

## 2023-12-21 NOTE — PROGRESS NOTES
[facility] GERIATRICS    Chief Complaint   Patient presents with    RECHECK     HPI:  Michael Duane Therriault is a 78 year old  (1945), who is being seen today for an episodic care visit at: [facility]) [[id number]].   He moved to Memory Care at this facility 2/2/2023 for a higher level of care.   Medical history significant for Alzheimer's disease, history of subarachnoid and subdural hematoma in 2020. No other medical issues and takes no meds. He had Neuropsychiatric testing 1/2020 and was only able to complete a few tasks due to severe deficits.     Today's concern is:      Infection due to 2019 novel coronavirus  Alzheimer's disease (H)  He is seen today to follow up on COVID-19 infection. He was febrile to 101.5, lethargic and had a runny nose on 12/19/2023. Rapid COVID positive. Not treated with antiviral as he is unable to swallow capsules.   He is seen today with his guardian/friend Karley Lubin, who is helping him with breakfast. He's alert and eating well. Makes eye contact, but doesn't smile as usual. He is nonverbal at baseline. Karley reports he's stronger today and was able to ambulate from his room to the dining room with assistance. Staff reports he's improving, though remains weaker than usual. Temp 98.0 today. No hypoxia.       Allergies, and PMH/PSH reviewed in EPIC today    REVIEW OF SYSTEMS:  Unable to obtain due to dementia. Positives as noted under HPI.       Objective:   Temp 98  F (36.7  C)   Resp 20   SpO2 100%   GENERAL APPEARANCE:  Alert, in no distress, thin  ENT:  [place]  EYES:  conjunctiva and lids normal  RESP: no respiratory distress   CV:  regular rate and rhythm, no murmur, no edema  M/S:  up in chair. ROBERT  PSYCH:  oriented to self, insight and judgement impaired, memory impaired , affect and mood normal    Recent labs in [place] reviewed by me today.       ASSESSMENT / PLAN:  (U07.1) Infection due to 2019 novel coronavirus  (primary encounter  diagnosis)  Comment: improving. Unable to receive antiviral as the capsules can't be opened and he's not able to swallow pills or capsules.   Plan: closely monitor symptoms.   Goals of care are comfort focused, no hospitalization, hospice if his status declines     (G30.9,  F02.80) Alzheimer's disease (H)  Comment: advanced disease with loss of verbal skills, low functional status   Plan: Memory Care staff assistance with cares, meals, activities, med admin      Electronically signed by: KADIE Doty CNP

## 2023-12-21 NOTE — LETTER
12/21/2023        RE: Michael Duane Therriault  C/o Karley Lubin  [address]  [facility] [address]        [facility] GERIATRICS    Chief Complaint   Patient presents with     RECHECK     HPI:  Michael Duane Therriault is a 78 year old  (1945), who is being seen today for an episodic care visit at: [facility] ([facility]) [[id number]].   He moved to Memory Care at this facility 2/2/2023 for a higher level of care.   Medical history significant for Alzheimer's disease, history of subarachnoid and subdural hematoma in 2020. No other medical issues and takes no meds. He had Neuropsychiatric testing 1/2020 and was only able to complete a few tasks due to severe deficits.     Today's concern is:      Infection due to 2019 novel coronavirus  Alzheimer's disease (H)  Weight loss  He is seen today after staff reported lethargy and temp 101.5. Rapid COVID-19 is positive. Staff reports he was unable to tolerate breakfast and required assist of 2 with transfer to a wheelchair. He is usually alert and ambulatory and requires assist of 1 with cares. Nonverbal at baseline. Staff reports he was his usual self yesterday. No cough noted. O2 sats >90%.       Allergies, and PMH/PSH reviewed in EPIC today    REVIEW OF SYSTEMS:  Unable to obtain due to dementia. Positives as noted under HPI.       Objective:   /86   Pulse 70   Temp (!) 101.5  F (38.6  C)   Resp 24   Wt 54.4 kg (120 lb)   BMI 20.28 kg/m    GENERAL APPEARANCE:  sleeping, flushed, diaphoretic   RESP:  lungs clear to auscultation   CV:  regular rate and rhythm, no murmur, no edema  ABDOMEN:  soft, non-tender, no distension  M/S:   in bed. No joint inflammation   SKIN:  no rashes or open areas      Recent labs in [facility] reviewed by me today.     ASSESSMENT / PLAN:  (U07.1) Infection due to 2019 novel coronavirus  (primary encounter diagnosis)  Comment: acutely ill with fever and lethargy. He is unable to swallow pills and capsules at  baseline. Consulted with Jo Ann Nicole, PharmD and both paxlovid and molnupiravir capsules should not be opened.   Discussed with his guardian/friend Karley Lubin and she agrees that he would not be able to swallow capsules. She accepts not being able to treat with antivirals and declines hospitalization based on goals of care and patient's history of not wanting meds and medical care. This is very reasonable in the setting of advanced dementia.   Plan: encourage fluids and intake as tolerated. Tylenol prn fever. Closely monitor over the next few days and if no improvement, refer to hospice.   Discussed with staff.     (G30.9,  F02.80) Alzheimer's disease (H)  Comment: advanced disease with loss of verbal skills and low functional status. No behavior issues   Plan: Memory Care staff assistance with cares, meals, activities, med admin      (R63.4) Weight loss  Comment: most recent weight of 120 lbs is down 15-20 lbs. He appears thinner, but not this much of a drop in one month   Plan: recheck weight. Staff supervision during meals         Electronically signed by: KADIE Doty CNP           Sincerely,        KADIE Doty CNP

## 2023-12-29 VITALS — RESPIRATION RATE: 20 BRPM | TEMPERATURE: 98 F | OXYGEN SATURATION: 100 %

## 2023-12-29 PROBLEM — U07.1 INFECTION DUE TO 2019 NOVEL CORONAVIRUS: Status: ACTIVE | Noted: 2023-12-29

## 2024-02-13 ENCOUNTER — DOCUMENTATION ONLY (OUTPATIENT)
Dept: GERIATRICS | Facility: CLINIC | Age: 79
End: 2024-02-13
Payer: COMMERCIAL

## 2024-03-04 VITALS
DIASTOLIC BLOOD PRESSURE: 74 MMHG | HEART RATE: 100 BPM | BODY MASS INDEX: 24.34 KG/M2 | RESPIRATION RATE: 20 BRPM | SYSTOLIC BLOOD PRESSURE: 100 MMHG | WEIGHT: 144 LBS | TEMPERATURE: 98.3 F

## 2024-03-05 ENCOUNTER — ASSISTED LIVING VISIT (OUTPATIENT)
Dept: GERIATRICS | Facility: CLINIC | Age: 79
End: 2024-03-05
Payer: COMMERCIAL

## 2024-03-05 DIAGNOSIS — F02.80 ALZHEIMER'S DISEASE (H): Primary | ICD-10-CM

## 2024-03-05 DIAGNOSIS — R26.81 GAIT INSTABILITY: ICD-10-CM

## 2024-03-05 DIAGNOSIS — G30.9 ALZHEIMER'S DISEASE (H): Primary | ICD-10-CM

## 2024-03-05 DIAGNOSIS — R63.4 WEIGHT LOSS: ICD-10-CM

## 2024-03-05 PROCEDURE — 99348 HOME/RES VST EST LOW MDM 30: CPT | Performed by: NURSE PRACTITIONER

## 2024-03-05 NOTE — LETTER
3/5/2024        RE: Michael Duane Therriault  C/o Karley Lubin  [address]  [facility] [address]        [facility] GERIATRICS    Chief Complaint   Patient presents with    RECHECK     HPI:  Michael Duane Therriault is a 78 year old  (1945), who is being seen today for an episodic care visit at: [facility] ([facility]) [[id number]]. Today's concern is: ***    Allergies, and PMH/PSH reviewed in [facility] today.  REVIEW OF SYSTEMS:  {kvfvkg45:395526}    Objective:   /74   Pulse 100   Temp 98.3  F (36.8  C)   Resp 20   Wt 65.3 kg (144 lb)   BMI 24.34 kg/m    {Nursing home physical exam :608088}    {fgslab:146423}    Assessment/Plan:  {FGS DX2:386762}    MED REC REQUIRED{TIP  Click the link below to document or use med rec list, use list to pull in response :177400}  Post Medication Reconciliation Status: {MED REC LIST:449337}      Orders:  {fgsorders:617963}  ***    Electronically signed by: Wilber Ernst ***           Sincerely,        KADIE Doty CNP

## 2024-03-05 NOTE — PROGRESS NOTES
[facility] GERIATRICS    Chief Complaint   Patient presents with    RECHECK     HPI:  Michael Duane Therriault is a 78 year old  (1945), who is being seen today for an episodic care visit at: [facility]) [[id number]].   He moved to Memory Care at this facility 2/2/2023 for a higher level of care.   Medical history significant for Alzheimer's disease, history of subarachnoid and subdural hematoma in 2020. No other medical issues and takes no meds. He had Neuropsychiatric testing 1/2020 and was only able to complete a few tasks due to severe deficits.   He tested positive for COVID-19 on 12/19/2023 and was not treated due to inability to swallow pills/capsules (he chews them) and goals of care.     Today's concern is:      Alzheimer's disease (H)  Gait instability  Weight loss  He is seen today with his friend/guardian Karley Lubin. He appears to recognize Karley. He's nonverbal and unable to provide history. Smiles and waves, but not truly interactive. Karley has noticed a decline in mobility. He had an unwitnessed fall 3/3/2024 without injury.  Good appetite. Karley comes daily to assist with meals. He continues to try and eat napkins, flowers and other items. Staff reports that he's needing more assistance with cares.No behaviors.       Allergies, and PMH/PSH reviewed in EPIC today    REVIEW OF SYSTEMS:  Unable to obtain due to dementia. Positives as noted under HPI.       Objective:   /74   Pulse 100   Temp 98.3  F (36.8  C)   Resp 20   Wt 65.3 kg (144 lb)   BMI 24.34 kg/m    GENERAL APPEARANCE:  Alert, in no distress, thin  ENT:  [place], oropharynx clear  EYES:  conjunctiva and lids normal  NECK:  no adenopathy, no thyromegaly  RESP:  lungs clear to auscultation   CV:  regular rate and rhythm, no murmur,  no edema  ABDOMEN:  soft, non-tender, no distension  M/S:   gait unsteady. ROBERT with good strength. No joint inflammation   SKIN:  no rashes or open areas  PSYCH:  oriented to  self, insight and judgement impaired, memory impaired , affect and mood normal    Recent labs in EPIC reviewed by me today.       ASSESSMENT / PLAN:  (G30.9,  F02.80) Alzheimer's disease (H)  (primary encounter diagnosis)  (R26.81) Gait instability  (R63.4) Weight loss  Comment: gradual progression with declining functional status. No s/s of acute illness.Weight is up ~ 9 lbs in the past few months.   Plan: Memory Care staff assistance with cares, meals, activities, med admin  Goals of care are comfort focused, no labs or interventions, no hospitalization, hospice when he meets criteria.         Electronically signed by: KADIE Doty CNP

## 2024-03-19 ENCOUNTER — PATIENT OUTREACH (OUTPATIENT)
Dept: GERIATRIC MEDICINE | Facility: CLINIC | Age: 79
End: 2024-03-19
Payer: COMMERCIAL

## 2024-03-19 NOTE — PROGRESS NOTES
[facility] Care Coordination Contact    [employer] Care Coordination    Annual chart review preformed for [employer] member.     Reviewed Epic notes and no triggering events noted. No hospitalizations or ED visits in the last 6 months. Daily assistance (including med management) provided by assisted living facility. Closely followed by onsite NP.    Writer will continue to follow via EMR and is available as needed.    Clarissa Bernal RN  [facility]  Cell: [phone]

## 2024-04-25 ENCOUNTER — ASSISTED LIVING VISIT (OUTPATIENT)
Dept: GERIATRICS | Facility: CLINIC | Age: 79
End: 2024-04-25
Payer: COMMERCIAL

## 2024-04-25 VITALS
SYSTOLIC BLOOD PRESSURE: 130 MMHG | RESPIRATION RATE: 20 BRPM | TEMPERATURE: 98.5 F | DIASTOLIC BLOOD PRESSURE: 67 MMHG | WEIGHT: 149.6 LBS | HEART RATE: 72 BPM | BODY MASS INDEX: 25.28 KG/M2

## 2024-04-25 DIAGNOSIS — R26.81 GAIT INSTABILITY: ICD-10-CM

## 2024-04-25 DIAGNOSIS — G30.9 ALZHEIMER'S DISEASE (H): Primary | ICD-10-CM

## 2024-04-25 DIAGNOSIS — F02.80 ALZHEIMER'S DISEASE (H): Primary | ICD-10-CM

## 2024-04-25 DIAGNOSIS — R63.4 WEIGHT LOSS: ICD-10-CM

## 2024-04-25 PROCEDURE — 99348 HOME/RES VST EST LOW MDM 30: CPT | Performed by: NURSE PRACTITIONER

## 2024-04-25 NOTE — LETTER
4/25/2024        RE: Michael Duane Therriault  C/o Karley Lubin  [address]  [facility] [address]        No notes on file      Sincerely,        KADIE Doty CNP

## 2024-04-25 NOTE — PROGRESS NOTES
[facility] GERIATRICS    Chief Complaint   Patient presents with    RECHECK     HPI:  Michael Duane Therriault is a 78 year old  (1945), who is being seen today for an episodic care visit at: [facility] [place]) [[id number]].   He moved to Memory Care at this facility 2/2/2023 for a higher level of care.   Medical history significant for Alzheimer's disease, history of subarachnoid and subdural hematoma in 2020. No other medical issues and takes no chronic meds. He had Neuropsychiatric testing 1/2020 and was only able to complete a few tasks due to severe deficits.   He tested positive for COVID-19 on 12/19/2023 and was not treated due to inability to swallow pills/capsules (he chews them) and goals of care.       Today's concern is:      Alzheimer's disease (H)  Gait instability  Weight loss  He is unable to provide history. Smiles and waves. He makes sounds, but longer speaks. Mobility has been declining and staff used a wheelchair today. Requires assist with all cares. He attempts to eat napkins, flowers and papers. Cooperative with cares and no behavior issues.   Staff reports he's moving to [facility] due to finances.       Allergies, and PMH/PSH reviewed in EPIC today    REVIEW OF SYSTEMS:  Unable to obtain due to dementia. Positives as noted under HPI.       Objective:   /67   Pulse 72   Temp 98.5  F (36.9  C)   Resp 20   Wt 67.9 kg (149 lb 9.6 oz)   BMI 25.28 kg/m    GENERAL APPEARANCE:  Alert, in no distress   ENT:  [place], oropharynx clear  EYES:  conjunctiva and lids normal  RESP:  lungs clear to auscultation   CV:  regular rate and rhythm, no murmur,  no edema  ABDOMEN:  soft, non-tender, no distension  M/S:   wheelchair. ROBERT with good strength. No joint inflammation   SKIN:  no rashes or open areas  PSYCH:  oriented to self, insight and judgement impaired, memory impaired , affect and mood normal    ASSESSMENT / PLAN:  (G30.9,  F02.80) Alzheimer's disease (H)   (primary encounter diagnosis)  Comment: advanced disease with loss of language and mobility. No behavior issues.   His friend Karley Lubin is guardian and very involved.   Plan: discharge to [facility] for long term care. Primary care will be assumed by an onsite team.   Goals of care are comfort focused, minimal meds, no labs, no hospitalization, hospice when appropriate.     (R26.81) Gait instability  Comment: due to progressive dementia. He's not a good candidate for therapies due to advanced dementia   Plan: wheelchair prn. Staff assistance with cares.     (R63.4) Weight loss  Comment: most recent weight is up another 5 lbs at 149 lbs, which is ~10 lb weight gain in the past 5 months   Plan: staff assistance at meals, follow weight           Electronically signed by: KADIE Doty CNP

## 2024-06-03 ENCOUNTER — PATIENT OUTREACH (OUTPATIENT)
Dept: GERIATRIC MEDICINE | Facility: CLINIC | Age: 79
End: 2024-06-03
Payer: COMMERCIAL

## 2024-06-03 NOTE — PROGRESS NOTES
[facility] Care Coordination Contact    Date of Disenrollment: 5/31/24  Reason for Disenrollment: Notified by NP that member moved to [facility] due to finances. E&E confirmed this with new facility and found out the new provider is Dr. Komal Acuña with [facility].    CMS and E&E notified of disenrollment.    Clarissa Bernal RN  [facility]  Cell: [phone]

## 2024-06-15 ENCOUNTER — HEALTH MAINTENANCE LETTER (OUTPATIENT)
Age: 79
End: 2024-06-15

## 2025-07-06 ENCOUNTER — HEALTH MAINTENANCE LETTER (OUTPATIENT)
Age: 80
End: 2025-07-06